# Patient Record
Sex: FEMALE | Race: WHITE | Employment: OTHER | URBAN - METROPOLITAN AREA
[De-identification: names, ages, dates, MRNs, and addresses within clinical notes are randomized per-mention and may not be internally consistent; named-entity substitution may affect disease eponyms.]

---

## 2017-05-18 ENCOUNTER — NURSE ONLY (OUTPATIENT)
Age: 67
End: 2017-05-18

## 2017-05-18 DIAGNOSIS — M81.0 OSTEOPOROSIS, POSTMENOPAUSAL: Primary | ICD-10-CM

## 2017-05-18 PROCEDURE — 96372 THER/PROPH/DIAG INJ SC/IM: CPT | Performed by: INTERNAL MEDICINE

## 2017-10-04 ENCOUNTER — HOSPITAL ENCOUNTER (OUTPATIENT)
Dept: MAMMOGRAPHY | Age: 67
Discharge: HOME | End: 2017-10-04
Admitting: OBSTETRICS & GYNECOLOGY

## 2017-10-04 DIAGNOSIS — Z12.31 VISIT FOR SCREENING MAMMOGRAM: ICD-10-CM

## 2018-01-24 ENCOUNTER — NURSE ONLY (OUTPATIENT)
Age: 68
End: 2018-01-24

## 2018-01-24 DIAGNOSIS — M81.0 OSTEOPOROSIS, POSTMENOPAUSAL: Primary | ICD-10-CM

## 2018-01-24 PROCEDURE — 96372 THER/PROPH/DIAG INJ SC/IM: CPT | Performed by: INTERNAL MEDICINE

## 2018-07-30 ENCOUNTER — HOSPITAL ENCOUNTER (OUTPATIENT)
Dept: GENERAL RADIOLOGY | Age: 68
Discharge: HOME | End: 2018-07-30
Payer: MEDICARE

## 2018-07-30 ENCOUNTER — PROCEDURE VISIT (OUTPATIENT)
Age: 68
End: 2018-07-30

## 2018-07-30 ENCOUNTER — OFFICE VISIT (OUTPATIENT)
Age: 68
End: 2018-07-30

## 2018-07-30 VITALS
HEIGHT: 66 IN | SYSTOLIC BLOOD PRESSURE: 118 MMHG | DIASTOLIC BLOOD PRESSURE: 62 MMHG | WEIGHT: 85.2 LBS | BODY MASS INDEX: 13.69 KG/M2

## 2018-07-30 DIAGNOSIS — M81.0 OSTEOPOROSIS, POSTMENOPAUSAL: ICD-10-CM

## 2018-07-30 DIAGNOSIS — M81.0 OSTEOPOROSIS, POSTMENOPAUSAL: Primary | ICD-10-CM

## 2018-07-30 DIAGNOSIS — E55.9 VITAMIN D DEFICIENCY: ICD-10-CM

## 2018-07-30 DIAGNOSIS — Z51.81 MEDICATION MONITORING ENCOUNTER: ICD-10-CM

## 2018-07-30 PROCEDURE — 1101F PT FALLS ASSESS-DOCD LE1/YR: CPT | Performed by: INTERNAL MEDICINE

## 2018-07-30 PROCEDURE — 77080 DXA BONE DENSITY AXIAL: CPT | Performed by: INTERNAL MEDICINE

## 2018-07-30 PROCEDURE — 1090F PRES/ABSN URINE INCON ASSESS: CPT | Performed by: INTERNAL MEDICINE

## 2018-07-30 PROCEDURE — 4040F PNEUMOC VAC/ADMIN/RCVD: CPT | Performed by: INTERNAL MEDICINE

## 2018-07-30 PROCEDURE — G8427 DOCREV CUR MEDS BY ELIG CLIN: HCPCS | Performed by: INTERNAL MEDICINE

## 2018-07-30 PROCEDURE — 3017F COLORECTAL CA SCREEN DOC REV: CPT | Performed by: INTERNAL MEDICINE

## 2018-07-30 PROCEDURE — 1123F ACP DISCUSS/DSCN MKR DOCD: CPT | Performed by: INTERNAL MEDICINE

## 2018-07-30 PROCEDURE — 1036F TOBACCO NON-USER: CPT | Performed by: INTERNAL MEDICINE

## 2018-07-30 PROCEDURE — G8419 CALC BMI OUT NRM PARAM NOF/U: HCPCS | Performed by: INTERNAL MEDICINE

## 2018-07-30 PROCEDURE — 77080 DXA BONE DENSITY AXIAL: CPT

## 2018-07-30 PROCEDURE — G8399 PT W/DXA RESULTS DOCUMENT: HCPCS | Performed by: INTERNAL MEDICINE

## 2018-07-30 PROCEDURE — 96372 THER/PROPH/DIAG INJ SC/IM: CPT | Performed by: INTERNAL MEDICINE

## 2018-07-30 PROCEDURE — 99214 OFFICE O/P EST MOD 30 MIN: CPT | Performed by: INTERNAL MEDICINE

## 2018-07-30 RX ORDER — ESOMEPRAZOLE MAGNESIUM 40 MG/1
40 FOR SUSPENSION ORAL DAILY
COMMUNITY
End: 2019-10-01 | Stop reason: ALTCHOICE

## 2018-07-30 RX ORDER — LORATADINE 10 MG/1
10 TABLET ORAL PRN
COMMUNITY

## 2018-07-30 NOTE — PROGRESS NOTES
[facility]) Osteoporosis and [address] [place] [address]   Phone [phone]  Fax [phone]    NAME: Jackie Warner   : 1950   STUDY DATE: 2018     REFERRING PROVIDER: Marj Chery MD    INDICATION(S) FOR PERFORMING THE STUDY:  osteoporosis, age-related (M81.0)    CLINICAL INFORMATION PROVIDED BY THE PATIENT: 73-year-old woman. She went through natural menopause in her late 45s. No history of fragility fractures. No long-term corticosteroid use. She took weekly Actonel [id number], monthly Boniva 2007-2013 and Actonel monthly 2013-2015. Current treatment is Prolia started 10/2015. EQUIPMENT: Hologic Discovery. POSITIONING: Good. REGIONS OF INTEREST: Correct. ARTIFACTS: None. STUDY VALID? Yes. Spine BMD is spuriously high because of generalized degenerative change;  no adjustments were made. T-scores  Initial study: 2009 L1-L4 -3.6 left total hip -3.1   Current study: 2018 L1-L4 -3.5 left total hip -2.9     The table below shows bone mineral density (grams/cm2), the appropriate measure for comparing serial scans. An increase or decrease is significant based on precision studies done at our center according to the ISCD protocol. PA spine Proximal Femur (left)   Date L1-L4 Fem. neck Trochanter Total hip   2009 0.655 0.639 NA 0.573   2012* 0.641 0.559 0.371 0.582   2013 0.619 0.606 0.389 0.579   2014 0.636 0.607 0.412 0.589   2015 0.622 0.584 0.421 0.574   10/18/2016 0.639 0.586 0.438 0.603   2018 0.667 0.606 0.423 0.590   *Done with [employer] equipment. BMD converted to Hologic units. IMPRESSION:  BONE DENSITY IS LOW, CONSISTENT WITH OSTEOPOROSIS.   BETWEEN 2017 AND 2018, BMD TRENDING UP IN THE SPINE BUT DID NOT CHANGE SIGNIFICANTLY IN THE LEFT HIP. COMPARED WITH 2015, BEFORE STARTING PROLIA, BMD IS HIGHER NOW IN THE SPINE AND TRENDING UP IN THE FEMORAL NECK AND TOTAL

## 2018-07-30 NOTE — PROGRESS NOTES
thought to have little or no systemic effect in lower doses but high doses could have adverse consequences. She is doing OK with Prolia started 10/2015. PLANS. Continue treatment with Prolia 60 mg SQ twice yearly. I gave her the contact information for Dr. Divya Yeung in [place] to try to work out her winter dose of Prolia. Return with DXA in 1 year. I spent 25 minutes face to face with this patient. Over 50% of that time was spent on counseling and care coordination. See assessment and plan for counseling and care coordination details. Shannan Hurd MD, Director, [employer] [facility]    CC: Yaquelin Corea MD

## 2018-10-08 ENCOUNTER — HOSPITAL ENCOUNTER (OUTPATIENT)
Dept: MAMMOGRAPHY | Age: 68
Discharge: HOME | End: 2018-10-08
Payer: MEDICARE

## 2018-10-08 DIAGNOSIS — Z12.31 VISIT FOR SCREENING MAMMOGRAM: ICD-10-CM

## 2018-10-08 PROCEDURE — 77063 BREAST TOMOSYNTHESIS BI: CPT

## 2019-02-22 DIAGNOSIS — M81.0 OSTEOPOROSIS, POSTMENOPAUSAL: Primary | ICD-10-CM

## 2019-02-28 ENCOUNTER — NURSE ONLY (OUTPATIENT)
Dept: ENDOCRINOLOGY | Age: 69
End: 2019-02-28
Payer: MEDICARE

## 2019-02-28 DIAGNOSIS — M81.0 OSTEOPOROSIS, POSTMENOPAUSAL: Primary | ICD-10-CM

## 2019-02-28 PROCEDURE — 96372 THER/PROPH/DIAG INJ SC/IM: CPT | Performed by: INTERNAL MEDICINE

## 2019-10-01 ENCOUNTER — PROCEDURE VISIT (OUTPATIENT)
Dept: ENDOCRINOLOGY | Age: 69
End: 2019-10-01

## 2019-10-01 ENCOUNTER — HOSPITAL ENCOUNTER (OUTPATIENT)
Dept: GENERAL RADIOLOGY | Age: 69
Discharge: HOME | End: 2019-10-01
Payer: MEDICARE

## 2019-10-01 ENCOUNTER — OFFICE VISIT (OUTPATIENT)
Dept: ENDOCRINOLOGY | Age: 69
End: 2019-10-01
Payer: MEDICARE

## 2019-10-01 VITALS
SYSTOLIC BLOOD PRESSURE: 110 MMHG | DIASTOLIC BLOOD PRESSURE: 74 MMHG | BODY MASS INDEX: 13.82 KG/M2 | WEIGHT: 86 LBS | HEIGHT: 66 IN

## 2019-10-01 DIAGNOSIS — M81.0 OSTEOPOROSIS, POSTMENOPAUSAL: ICD-10-CM

## 2019-10-01 DIAGNOSIS — M81.0 OSTEOPOROSIS, POSTMENOPAUSAL: Primary | ICD-10-CM

## 2019-10-01 DIAGNOSIS — E55.9 VITAMIN D DEFICIENCY: ICD-10-CM

## 2019-10-01 DIAGNOSIS — Z51.81 MEDICATION MONITORING ENCOUNTER: ICD-10-CM

## 2019-10-01 PROCEDURE — G8419 CALC BMI OUT NRM PARAM NOF/U: HCPCS | Performed by: INTERNAL MEDICINE

## 2019-10-01 PROCEDURE — 4040F PNEUMOC VAC/ADMIN/RCVD: CPT | Performed by: INTERNAL MEDICINE

## 2019-10-01 PROCEDURE — 99214 OFFICE O/P EST MOD 30 MIN: CPT | Performed by: INTERNAL MEDICINE

## 2019-10-01 PROCEDURE — G8484 FLU IMMUNIZE NO ADMIN: HCPCS | Performed by: INTERNAL MEDICINE

## 2019-10-01 PROCEDURE — 1090F PRES/ABSN URINE INCON ASSESS: CPT | Performed by: INTERNAL MEDICINE

## 2019-10-01 PROCEDURE — 1123F ACP DISCUSS/DSCN MKR DOCD: CPT | Performed by: INTERNAL MEDICINE

## 2019-10-01 PROCEDURE — 77080 DXA BONE DENSITY AXIAL: CPT

## 2019-10-01 PROCEDURE — 77080 DXA BONE DENSITY AXIAL: CPT | Performed by: INTERNAL MEDICINE

## 2019-10-01 PROCEDURE — G8399 PT W/DXA RESULTS DOCUMENT: HCPCS | Performed by: INTERNAL MEDICINE

## 2019-10-01 PROCEDURE — G8427 DOCREV CUR MEDS BY ELIG CLIN: HCPCS | Performed by: INTERNAL MEDICINE

## 2019-10-01 PROCEDURE — 96372 THER/PROPH/DIAG INJ SC/IM: CPT | Performed by: INTERNAL MEDICINE

## 2019-10-01 PROCEDURE — 3017F COLORECTAL CA SCREEN DOC REV: CPT | Performed by: INTERNAL MEDICINE

## 2019-10-01 PROCEDURE — 1036F TOBACCO NON-USER: CPT | Performed by: INTERNAL MEDICINE

## 2019-10-01 RX ORDER — CLOPIDOGREL BISULFATE 75 MG/1
TABLET ORAL
COMMUNITY
Start: 2019-08-03

## 2019-10-01 RX ORDER — RANITIDINE 150 MG/1
150 TABLET ORAL
COMMUNITY
Start: 2018-06-07

## 2019-10-01 RX ORDER — ATORVASTATIN CALCIUM 80 MG/1
TABLET, FILM COATED ORAL
COMMUNITY
Start: 2019-09-15

## 2019-10-01 RX ORDER — GLYCOPYRROLATE AND FORMOTEROL FUMARATE 9; 4.8 UG/1; UG/1
AEROSOL, METERED RESPIRATORY (INHALATION)
COMMUNITY
Start: 2019-08-05 | End: 2020-10-12

## 2019-10-01 RX ORDER — PANTOPRAZOLE SODIUM 40 MG/1
20 TABLET, DELAYED RELEASE ORAL
COMMUNITY
Start: 2019-09-28

## 2019-10-01 RX ORDER — TRIAMCINOLONE ACETONIDE 1 MG/G
CREAM TOPICAL
COMMUNITY
Start: 2019-08-01

## 2019-10-21 ENCOUNTER — HOSPITAL ENCOUNTER (OUTPATIENT)
Dept: MAMMOGRAPHY | Age: 69
Discharge: HOME | End: 2019-10-21
Payer: MEDICARE

## 2019-10-21 DIAGNOSIS — Z12.31 VISIT FOR SCREENING MAMMOGRAM: ICD-10-CM

## 2019-10-21 PROCEDURE — 77063 BREAST TOMOSYNTHESIS BI: CPT

## 2020-05-01 ENCOUNTER — NURSE ONLY (OUTPATIENT)
Dept: ENDOCRINOLOGY | Age: 70
End: 2020-05-01
Payer: MEDICARE

## 2020-05-01 PROCEDURE — 96372 THER/PROPH/DIAG INJ SC/IM: CPT | Performed by: INTERNAL MEDICINE

## 2020-10-12 ENCOUNTER — HOSPITAL ENCOUNTER (OUTPATIENT)
Dept: GENERAL RADIOLOGY | Age: 70
Discharge: HOME | End: 2020-10-12
Payer: MEDICARE

## 2020-10-12 ENCOUNTER — OFFICE VISIT (OUTPATIENT)
Dept: ENDOCRINOLOGY | Age: 70
End: 2020-10-12
Payer: MEDICARE

## 2020-10-12 ENCOUNTER — PROCEDURE VISIT (OUTPATIENT)
Dept: ENDOCRINOLOGY | Age: 70
End: 2020-10-12

## 2020-10-12 VITALS
SYSTOLIC BLOOD PRESSURE: 100 MMHG | DIASTOLIC BLOOD PRESSURE: 50 MMHG | HEIGHT: 65 IN | TEMPERATURE: 97.5 F | HEART RATE: 62 BPM | BODY MASS INDEX: 14.43 KG/M2 | OXYGEN SATURATION: 92 % | WEIGHT: 86.6 LBS

## 2020-10-12 PROCEDURE — 1123F ACP DISCUSS/DSCN MKR DOCD: CPT | Performed by: INTERNAL MEDICINE

## 2020-10-12 PROCEDURE — 99214 OFFICE O/P EST MOD 30 MIN: CPT | Performed by: INTERNAL MEDICINE

## 2020-10-12 PROCEDURE — 3017F COLORECTAL CA SCREEN DOC REV: CPT | Performed by: INTERNAL MEDICINE

## 2020-10-12 PROCEDURE — G8399 PT W/DXA RESULTS DOCUMENT: HCPCS | Performed by: INTERNAL MEDICINE

## 2020-10-12 PROCEDURE — 77080 DXA BONE DENSITY AXIAL: CPT | Performed by: INTERNAL MEDICINE

## 2020-10-12 PROCEDURE — 4040F PNEUMOC VAC/ADMIN/RCVD: CPT | Performed by: INTERNAL MEDICINE

## 2020-10-12 PROCEDURE — 1036F TOBACCO NON-USER: CPT | Performed by: INTERNAL MEDICINE

## 2020-10-12 PROCEDURE — G8484 FLU IMMUNIZE NO ADMIN: HCPCS | Performed by: INTERNAL MEDICINE

## 2020-10-12 PROCEDURE — G8427 DOCREV CUR MEDS BY ELIG CLIN: HCPCS | Performed by: INTERNAL MEDICINE

## 2020-10-12 PROCEDURE — G8419 CALC BMI OUT NRM PARAM NOF/U: HCPCS | Performed by: INTERNAL MEDICINE

## 2020-10-12 PROCEDURE — 1090F PRES/ABSN URINE INCON ASSESS: CPT | Performed by: INTERNAL MEDICINE

## 2020-10-12 PROCEDURE — 77080 DXA BONE DENSITY AXIAL: CPT

## 2020-10-12 NOTE — PROGRESS NOTES
[facility] ([facility]) Osteoporosis and [address] [facility]  [place] [address] [facility] [address]  Phone [phone]  Fax [phone]    NAME:  Norberto Moore  : 1950  CONSULT DATE: 2013  MOST RECENT VISIT: 10/01/2019  TODAYS DATE: 10/12/2020    Labs @ [facility] 2020    PROBLEMS. Osteoporosis diagnosed , DXA results not available    DXA 2009, lowest T-score -3.6 in the spine     BMD may have decreased in the femoral neck [id number]  Natural menopause age mid to late-40s, ~  Vitamin D deficiency, desirable 25-OH is 30-60 ng/mL    10 ng/mL 2009    53 ng/mL2010 on vitamin D 1600 IU/d    53 ng/mL 2015    46 ng/mL 2017  Microscopic colitis diagnosed , flare in , not yet controlled  NEW  anemia => high cardiac CT score => coronary artery disease, 3 stents    CURRENT MANAGEMENT FOR OSTEOPOROSIS. Calcium, diet 800 mg/d  mg/d = 1300 mg/d    300 mg/d cheese, 200 mg/d leafy greens, 300 mg/d Other  Vitamin D, 1000 IU + 800 IU MVI= 1800 IU/d  Exercise, walking  Pharmacologic therapy, Prolia 60 mg SQ twice yearly started 10/2015    PREVIOUS MEDICATIONS FOR OSTEOPOROSIS. Estrogen, ~-~  Actonel 35 mg/week, [id number]  Boniva 2007-2013  Actonel 150 mg monthly 2013-10/2015    OTHER CURRENT MEDICATIONS. Euceris  OTC MEDICATIONS. ASA 81 mg daily    CHIEF COMPLAINT. Here for followup of osteoporosis and vitamin D deficiency, monitoring treatment. No new related signs or symptoms. INTERVAL HISTORY. See problem list for chronic/inactive conditions. She received Prolia without side effects. No falls, near-falls or fractures. Colitis has not been much of a problem this year. They so their place in [place] so are back in [place] full time. FOR FULL DETAILS OF FAMILY HISTORY, PAST MEDICAL AND SURGICAL HISTORY, SOCIAL HISTORY, AND REVIEW OF SYSTEMS, SEE PATIENT QUESTIONNAIRE OF TODAYS DATE. PHYSICAL EXAMINATION. GENERAL.   [place] thin, normally proportioned adult. BMI 15.3  MENTAL STATUS. Pleasant mood. Oriented to time, place, and person. MUSCULOSKELETAL. Spinal contours are normal.  No spine tenderness to palpation or percussion. Three finger spaces between ribs and pelvis. Gait steady without assistance. NEUROLOGICAL. Able to rise from chair without using arms. No apparent focal motor or sensory deficit. Coordination appears normal.     BONE DENSITY. Most recent done here using Hologic equipment. T-scores  Initial study: 11/17/2009 L1-L4 -3.6 left total hip -3.1   Current study: 10/12/2020 L1-L4 -3.6 left total hip -2.9     The table below shows bone mineral density (grams/cm2), the appropriate measure for comparing serial scans. An increase or decrease is significant based on precision studies done at our center according to the ISCD protocol. PA spine Proximal Femur (left)   Date L1-L4 Fem. neck Trochanter Total hip   11/17/2009 0.655 0.639 NA 0.573   09/17/2012* 0.641 0.559 0.371 0.582   09/23/2013 0.619 0.606 0.389 0.579   09/23/2014 0.636 0.607 0.412 0.589   09/28/2015 0.622 0.584 0.421 0.574   10/18/2016 0.639 0.586 0.438 0.603   07/30/2018 0.667 0.606 0.423 0.590   10/01/2019 0.639 0.591 0.436 0.593   10/12/2020 0.652 0.597 0.433 0.589   *Done with [employer] equipment. BMD converted to Hologic units. IMPRESSION:  BONE DENSITY IS LOW, CONSISTENT WITH OSTEOPOROSIS. SINCE THE PREVIOUS DXA, BMD DID NOT CHANGE SIGNIFICANTLY IN THE SPINE OR LEFT HIP. COMPARED WITH 2015, BEFORE STARTING PROLIA, BMD IS HIGHER NOW IN THE SPINE. Labs: 09/2013, Ca 9.5, Cr 0.7, . 08/2015, Ca 9.3, Cr 0.7. 05/2016 Ca 9.4 Cr 0.7.  03/2018 Ca 8.8 Cr 0.7. 01/2019 Ca 8.5 Cr 0.5. 06/2020 Ca 9.6 Cr 0.7. IMAGING. DXA printouts reviewed. 01/2018 CXR. ASSESSMENT. Osteoporosis, bone density lower than desirable. BMD may have decreased in the femoral neck 5234-9798 in spite of treatment with Boniva.  BMD has been stable or increased since 2012 but still quite low. Risk of fracture is high due in part to continued activity of colitis. Entocort and Uceris are thought to have little or no systemic effect in lower doses but high doses could have adverse consequences. She is doing OK with Prolia started 10/2015. PLANS. Continue treatment with Prolia 60 mg SQ twice yearly. Return with DXA in 1 year. I spent 25 minutes face to face with this patient. Over 50% of that time was spent on counseling and care coordination. See assessment and plan for counseling and care coordination details. Maddy Hurd MD, Director, [facility]) Osteoporosis and Bone Health Services    CC: Jomar Tovar MD

## 2020-10-12 NOTE — RESULT ENCOUNTER NOTE
[facility]) Osteoporosis and [address] [place] [address]., [address] [place] [address]   Phone [phone]  Fax [phone]    NAME: Quincy Mallory   : 1950   STUDY DATE: 10/12/2020     REFERRING PROVIDER: Thien Carpio MD    INDICATION(S) FOR PERFORMING THE STUDY:  osteoporosis, age-related (M81.0)    CLINICAL INFORMATION PROVIDED BY THE PATIENT: 22-year-old woman. She went through natural menopause in her late 45s. No history of fragility fractures. No long-term corticosteroid use. She took weekly Actonel [id number], monthly Boniva 2007-2013 and Actonel monthly 2013-2015. Current treatment is Prolia started 10/2015. EQUIPMENT: Hologic Discovery. POSITIONING: Good. REGIONS OF INTEREST: Correct. ARTIFACTS: None. STUDY VALID? Yes. Spine BMD is spuriously high because of generalized degenerative change;  no adjustments were made. T-scores  Initial study: 2009 L1-L4 -3.6 left total hip -3.1   Current study: 10/12/2020 L1-L4 -3.6 left total hip -2.9     The table below shows bone mineral density (grams/cm2), the appropriate measure for comparing serial scans. An increase or decrease is significant based on precision studies done at our center according to the ISCD protocol. PA spine Proximal Femur (left)   Date L1-L4 Fem. neck Trochanter Total hip   2009 0.655 0.639 NA 0.573   2012* 0.641 0.559 0.371 0.582   2013 0.619 0.606 0.389 0.579   2014 0.636 0.607 0.412 0.589   2015 0.622 0.584 0.421 0.574   10/18/2016 0.639 0.586 0.438 0.603   2018 0.667 0.606 0.423 0.590   10/01/2019 0.639 0.591 0.436 0.593   10/12/2020 0.652 0.597 0.433 0.589   *Done with [facility] 428 equipment. BMD converted to Hologic units. IMPRESSION:  BONE DENSITY IS LOW, CONSISTENT WITH OSTEOPOROSIS.   SINCE THE PREVIOUS DXA, BMD DID NOT CHANGE SIGNIFICANTLY IN THE SPINE OR LEFT HIP. COMPARED WITH , BEFORE STARTING PROLIA, BMD IS HIGHER NOW IN

## 2020-11-04 ENCOUNTER — NURSE ONLY (OUTPATIENT)
Dept: ENDOCRINOLOGY | Age: 70
End: 2020-11-04
Payer: MEDICARE

## 2020-11-04 PROCEDURE — 96372 THER/PROPH/DIAG INJ SC/IM: CPT | Performed by: INTERNAL MEDICINE

## 2020-11-24 ENCOUNTER — HOSPITAL ENCOUNTER (OUTPATIENT)
Dept: MAMMOGRAPHY | Age: 70
Discharge: HOME | End: 2020-11-24
Payer: MEDICARE

## 2020-11-24 PROCEDURE — 77063 BREAST TOMOSYNTHESIS BI: CPT

## 2021-05-20 ENCOUNTER — NURSE ONLY (OUTPATIENT)
Dept: ENDOCRINOLOGY | Age: 71
End: 2021-05-20
Payer: MEDICARE

## 2021-05-20 DIAGNOSIS — M81.0 AGE-RELATED OSTEOPOROSIS WITHOUT CURRENT PATHOLOGICAL FRACTURE: Primary | ICD-10-CM

## 2021-05-20 PROCEDURE — 96372 THER/PROPH/DIAG INJ SC/IM: CPT | Performed by: INTERNAL MEDICINE

## 2021-11-29 ENCOUNTER — HOSPITAL ENCOUNTER (OUTPATIENT)
Dept: MAMMOGRAPHY | Age: 71
Discharge: HOME | End: 2021-11-29
Payer: MEDICARE

## 2021-11-29 VITALS — WEIGHT: 80 LBS | BODY MASS INDEX: 13.33 KG/M2 | HEIGHT: 65 IN

## 2021-11-29 DIAGNOSIS — Z12.31 VISIT FOR SCREENING MAMMOGRAM: ICD-10-CM

## 2021-11-29 PROCEDURE — 77063 BREAST TOMOSYNTHESIS BI: CPT

## 2021-12-14 ENCOUNTER — HOSPITAL ENCOUNTER (OUTPATIENT)
Dept: GENERAL RADIOLOGY | Age: 71
Discharge: HOME | End: 2021-12-14
Payer: MEDICARE

## 2021-12-14 ENCOUNTER — OFFICE VISIT (OUTPATIENT)
Dept: ENDOCRINOLOGY | Age: 71
End: 2021-12-14
Payer: MEDICARE

## 2021-12-14 ENCOUNTER — PROCEDURE VISIT (OUTPATIENT)
Dept: ENDOCRINOLOGY | Age: 71
End: 2021-12-14

## 2021-12-14 VITALS
HEIGHT: 65 IN | SYSTOLIC BLOOD PRESSURE: 106 MMHG | WEIGHT: 81.4 LBS | BODY MASS INDEX: 13.56 KG/M2 | DIASTOLIC BLOOD PRESSURE: 60 MMHG

## 2021-12-14 DIAGNOSIS — M81.0 OSTEOPOROSIS, POSTMENOPAUSAL: ICD-10-CM

## 2021-12-14 DIAGNOSIS — E55.9 VITAMIN D DEFICIENCY: ICD-10-CM

## 2021-12-14 DIAGNOSIS — M81.0 OSTEOPOROSIS, POSTMENOPAUSAL: Primary | ICD-10-CM

## 2021-12-14 DIAGNOSIS — Z51.81 MEDICATION MONITORING ENCOUNTER: ICD-10-CM

## 2021-12-14 PROCEDURE — G8484 FLU IMMUNIZE NO ADMIN: HCPCS | Performed by: INTERNAL MEDICINE

## 2021-12-14 PROCEDURE — G8427 DOCREV CUR MEDS BY ELIG CLIN: HCPCS | Performed by: INTERNAL MEDICINE

## 2021-12-14 PROCEDURE — G8419 CALC BMI OUT NRM PARAM NOF/U: HCPCS | Performed by: INTERNAL MEDICINE

## 2021-12-14 PROCEDURE — 1123F ACP DISCUSS/DSCN MKR DOCD: CPT | Performed by: INTERNAL MEDICINE

## 2021-12-14 PROCEDURE — 77080 DXA BONE DENSITY AXIAL: CPT

## 2021-12-14 PROCEDURE — 1036F TOBACCO NON-USER: CPT | Performed by: INTERNAL MEDICINE

## 2021-12-14 PROCEDURE — 4040F PNEUMOC VAC/ADMIN/RCVD: CPT | Performed by: INTERNAL MEDICINE

## 2021-12-14 PROCEDURE — G8399 PT W/DXA RESULTS DOCUMENT: HCPCS | Performed by: INTERNAL MEDICINE

## 2021-12-14 PROCEDURE — 1090F PRES/ABSN URINE INCON ASSESS: CPT | Performed by: INTERNAL MEDICINE

## 2021-12-14 PROCEDURE — 96372 THER/PROPH/DIAG INJ SC/IM: CPT | Performed by: INTERNAL MEDICINE

## 2021-12-14 PROCEDURE — 99214 OFFICE O/P EST MOD 30 MIN: CPT | Performed by: INTERNAL MEDICINE

## 2021-12-14 PROCEDURE — 77080 DXA BONE DENSITY AXIAL: CPT | Performed by: INTERNAL MEDICINE

## 2021-12-14 PROCEDURE — 3017F COLORECTAL CA SCREEN DOC REV: CPT | Performed by: INTERNAL MEDICINE

## 2021-12-14 RX ORDER — DOXYCYCLINE HYCLATE 50 MG/1
324 CAPSULE, GELATIN COATED ORAL
COMMUNITY

## 2021-12-14 RX ORDER — LEVOTHYROXINE SODIUM 0.05 MG/1
50 TABLET ORAL DAILY
COMMUNITY
Start: 2021-11-09

## 2021-12-14 NOTE — RESULT ENCOUNTER NOTE
[facility]) Osteoporosis and [address] [facility] [address] [facility], [address] [facility],[place] [address]  Phone [phone]  Fax [phone]    NAME: Cheryle Dials   : 1950   STUDY DATE: 2021     REFERRING PROVIDER: William Baptiste MD    INDICATION(S) FOR PERFORMING THE STUDY:  osteoporosis, age-related (M81.0)    CLINICAL INFORMATION PROVIDED BY THE PATIENT: 73-year-old woman. She went through natural menopause in her late 45s. No history of fragility fractures. No long-term corticosteroid use. She took weekly Actonel [id number], monthly Boniva 2007-2013 and Actonel monthly 2013-2015. Current treatment is Prolia started 10/2015. EQUIPMENT: Hologic Discovery. POSITIONING: Good. REGIONS OF INTEREST: Correct. ARTIFACTS: None. STUDY VALID? Yes. Spine BMD is spuriously high because of generalized degenerative change;  no adjustments were made. T-scores  Initial study: 2009 L1-L4 -3.6 left total hip -3.1   Current study: 2021 L1-L4 -3.6 left total hip -2.7     The table below shows bone mineral density (grams/cm2), the appropriate measure for comparing serial scans. An increase or decrease is significant based on precision studies done at our center according to the ISCD protocol. PA spine Proximal Femur (left)   Date L1-L4 Fem. neck Trochanter Total hip   2009 0.655 0.639 NA 0.573   2013 0.619 0.606 0.389 0.579   2014 0.636 0.607 0.412 0.589   2015 0.622 0.584 0.421 0.574   2018 0.667 0.606 0.423 0.590   10/12/2020 0.652 0.597 0.433 0.589   2021 0.665 0.589 0.448 0.618   *Done with [employer] equipment. BMD converted to Hologic units. IMPRESSION:  BONE DENSITY IS LOW, CONSISTENT WITH OSTEOPOROSIS. SINCE THE PREVIOUS DXA, BMD DID NOT CHANGE SIGNIFICANTLY IN THE SPINE OR LEFT HIP. COMPARED WITH 2015, BEFORE STARTING PROLIA, BMD IS HIGHER NOW IN THE SPINE.      Consider repeating this study in 1-2 years to assess the patient's progress. _________________________________________________   Reuben Hurd MD, Director, [facility] ([facility]) Osteoporosis and [address] [facility]

## 2021-12-14 NOTE — PROGRESS NOTES
[facility] ([facility]) Osteoporosis and [address] [facility]  [place] [address] [facility], [address]  Phone [phone]  Fax [phone]    NAME:  Thania Subramanian  : 1950  CONSULT DATE: 2013  MOST RECENT VISIT: 10/12/2020  TODAYS DATE: 2021    Labs @ [facility] 2021    PROBLEMS. Osteoporosis diagnosed , DXA results not available    DXA 2009, lowest T-score -3.6 in the spine     BMD may have decreased in the femoral neck [id number]  Natural menopause age mid to late-40s, ~  Vitamin D deficiency, desirable 25-OH is 30-60 ng/mL    10 ng/mL 2009    53 ng/mL2010 on vitamin D 1600 IU/d    53 ng/mL 2015    46 ng/mL 2017    51 ng/mL 10/2020  Microscopic colitis diagnosed , flare in , not yet controlled  NEW  anemia => high cardiac CT score => coronary artery disease, 3 stents    CURRENT MANAGEMENT FOR OSTEOPOROSIS. Calcium, diet 800 mg/d  mg/d = 1300 mg/d    300 mg/d cheese, 200 mg/d leafy greens, 300 mg/d Other  Vitamin D, 1000 IU + 800 IU MVI= 1800 IU/d  Exercise, walking  Pharmacologic therapy, Prolia 60 mg SQ twice yearly started 10/2015    PREVIOUS MEDICATIONS FOR OSTEOPOROSIS. Estrogen, ~-~  Actonel 35 mg/week, [id number]  Boniva 2007-2013  Actonel 150 mg monthly 2013-10/2015    OTHER CURRENT MEDICATIONS. Euceris, budesonide  OTC MEDICATIONS. ASA 81 mg daily    CHIEF COMPLAINT. Here for followup of osteoporosis and vitamin D deficiency, monitoring treatment. No new related signs or symptoms. INTERVAL HISTORY. See problem list for chronic/inactive conditions. She received Prolia without side effects. No falls, near-falls or fractures. Colitis has not been much of a problem this year. They so their place in [place] so are back in [place] full time. FOR FULL DETAILS OF FAMILY HISTORY, PAST MEDICAL AND SURGICAL HISTORY, SOCIAL HISTORY, AND REVIEW OF SYSTEMS, SEE PATIENT QUESTIONNAIRE OF TODAYS DATE.     PHYSICAL EXAMINATION. GENERAL. Petite, thin, normally proportioned adult. BMI 15.3  MENTAL STATUS. Pleasant mood. Oriented to time, place, and person. MUSCULOSKELETAL. Spinal contours are normal.  No spine tenderness to palpation or percussion. Three finger spaces between ribs and pelvis. Gait steady without assistance. NEUROLOGICAL. Able to rise from chair without using arms. No apparent focal motor or sensory deficit. Coordination appears normal.     BONE DENSITY. Most recent done here using Hologic equipment. T-scores  Initial study: 11/17/2009 L1-L4 -3.6 left total hip -3.1   Current study: 12/14/2021 L1-L4 -3.6 left total hip -2.7     The table below shows bone mineral density (grams/cm2), the appropriate measure for comparing serial scans. An increase or decrease is significant based on precision studies done at our center according to the ISCD protocol. PA spine Proximal Femur (left)   Date L1-L4 Fem. neck Trochanter Total hip   11/17/2009 0.655 0.639 NA 0.573   09/23/2013 0.619 0.606 0.389 0.579   09/23/2014 0.636 0.607 0.412 0.589   09/28/2015 0.622 0.584 0.421 0.574   07/30/2018 0.667 0.606 0.423 0.590   10/12/2020 0.652 0.597 0.433 0.589   12/14/2021 0.665 0.589 0.448 0.618   *Done with [facility] equipment. BMD converted to Hologic units. IMPRESSION:  BONE DENSITY IS LOW, CONSISTENT WITH OSTEOPOROSIS. SINCE THE PREVIOUS DXA, BMD DID NOT CHANGE SIGNIFICANTLY IN THE SPINE OR LEFT HIP. COMPARED WITH 2015, BEFORE STARTING PROLIA, BMD IS HIGHER NOW IN THE SPINE. Labs: 09/2013, Ca 9.5, Cr 0.7, . 08/2015, Ca 9.3, Cr 0.7. 05/2016 Ca 9.4 Cr 0.7.  03/2018 Ca 8.8 Cr 0.7. 01/2019 Ca 8.5 Cr 0.5. 06/2020 Ca 9.6 Cr 0.7. 11/2021 Ca 9.4 Cr 0.8. IMAGING. DXA printouts reviewed. 01/2018 CXR. ASSESSMENT. Osteoporosis, bone density lower than desirable. BMD may have decreased in the femoral neck 3890-6430 in spite of treatment with Boniva.  BMD has been stable or increased since 2012 but still quite low. Risk of fracture is high due in part to continued activity of colitis. Entocort and Uceris are thought to have little or no systemic effect in lower doses but high doses could have adverse consequences. She is doing OK with Prolia started 10/2015. PLANS. Continue treatment with Prolia 60 mg SQ twice yearly. Return with DXA in 1 year. Time spent today: 30-40 minutes. Oral Hurd MD, Director, [facility]) Osteoporosis and Bone Health Services    CC: Venkatesh Jacobsen [facility] MD

## 2022-05-25 ENCOUNTER — NURSE ONLY (OUTPATIENT)
Dept: ENDOCRINOLOGY | Age: 72
End: 2022-05-25
Payer: MEDICARE

## 2022-05-25 DIAGNOSIS — M81.0 OSTEOPOROSIS, POSTMENOPAUSAL: ICD-10-CM

## 2022-05-25 PROCEDURE — 96372 THER/PROPH/DIAG INJ SC/IM: CPT | Performed by: INTERNAL MEDICINE

## 2022-12-07 ENCOUNTER — NURSE ONLY (OUTPATIENT)
Dept: ENDOCRINOLOGY | Age: 72
End: 2022-12-07
Payer: MEDICARE

## 2022-12-07 DIAGNOSIS — M81.0 OSTEOPOROSIS, POSTMENOPAUSAL: Primary | ICD-10-CM

## 2022-12-07 PROCEDURE — 96372 THER/PROPH/DIAG INJ SC/IM: CPT | Performed by: INTERNAL MEDICINE

## 2022-12-12 ENCOUNTER — HOSPITAL ENCOUNTER (OUTPATIENT)
Dept: MAMMOGRAPHY | Age: 72
Discharge: HOME | End: 2022-12-12
Payer: MEDICARE

## 2022-12-12 VITALS — HEIGHT: 65 IN | BODY MASS INDEX: 13.49 KG/M2 | WEIGHT: 81 LBS

## 2022-12-12 DIAGNOSIS — Z12.31 VISIT FOR SCREENING MAMMOGRAM: ICD-10-CM

## 2022-12-12 PROCEDURE — 77067 SCR MAMMO BI INCL CAD: CPT

## 2023-01-24 ENCOUNTER — PROCEDURE VISIT (OUTPATIENT)
Dept: ENDOCRINOLOGY | Age: 73
End: 2023-01-24

## 2023-01-24 ENCOUNTER — OFFICE VISIT (OUTPATIENT)
Dept: ENDOCRINOLOGY | Age: 73
End: 2023-01-24
Payer: MEDICARE

## 2023-01-24 ENCOUNTER — HOSPITAL ENCOUNTER (OUTPATIENT)
Dept: GENERAL RADIOLOGY | Age: 73
Discharge: HOME | End: 2023-01-24
Payer: MEDICARE

## 2023-01-24 VITALS
HEIGHT: 65 IN | WEIGHT: 79 LBS | BODY MASS INDEX: 13.16 KG/M2 | DIASTOLIC BLOOD PRESSURE: 65 MMHG | SYSTOLIC BLOOD PRESSURE: 122 MMHG

## 2023-01-24 DIAGNOSIS — M81.0 OSTEOPOROSIS, POSTMENOPAUSAL: Primary | ICD-10-CM

## 2023-01-24 DIAGNOSIS — Z51.81 MEDICATION MONITORING ENCOUNTER: ICD-10-CM

## 2023-01-24 DIAGNOSIS — M81.0 OSTEOPOROSIS, POSTMENOPAUSAL: ICD-10-CM

## 2023-01-24 DIAGNOSIS — E55.9 VITAMIN D DEFICIENCY: ICD-10-CM

## 2023-01-24 PROCEDURE — G8399 PT W/DXA RESULTS DOCUMENT: HCPCS | Performed by: INTERNAL MEDICINE

## 2023-01-24 PROCEDURE — G8427 DOCREV CUR MEDS BY ELIG CLIN: HCPCS | Performed by: INTERNAL MEDICINE

## 2023-01-24 PROCEDURE — 77080 DXA BONE DENSITY AXIAL: CPT | Performed by: INTERNAL MEDICINE

## 2023-01-24 PROCEDURE — 1036F TOBACCO NON-USER: CPT | Performed by: INTERNAL MEDICINE

## 2023-01-24 PROCEDURE — 1123F ACP DISCUSS/DSCN MKR DOCD: CPT | Performed by: INTERNAL MEDICINE

## 2023-01-24 PROCEDURE — 77080 DXA BONE DENSITY AXIAL: CPT

## 2023-01-24 PROCEDURE — 99214 OFFICE O/P EST MOD 30 MIN: CPT | Performed by: INTERNAL MEDICINE

## 2023-01-24 PROCEDURE — 3017F COLORECTAL CA SCREEN DOC REV: CPT | Performed by: INTERNAL MEDICINE

## 2023-01-24 PROCEDURE — G8484 FLU IMMUNIZE NO ADMIN: HCPCS | Performed by: INTERNAL MEDICINE

## 2023-01-24 PROCEDURE — G8419 CALC BMI OUT NRM PARAM NOF/U: HCPCS | Performed by: INTERNAL MEDICINE

## 2023-01-24 PROCEDURE — 1090F PRES/ABSN URINE INCON ASSESS: CPT | Performed by: INTERNAL MEDICINE

## 2023-01-24 RX ORDER — RANOLAZINE 500 MG/1
TABLET, EXTENDED RELEASE ORAL
COMMUNITY
Start: 2022-10-28

## 2023-01-24 RX ORDER — FLUTICASONE FUROATE, UMECLIDINIUM BROMIDE AND VILANTEROL TRIFENATATE 200; 62.5; 25 UG/1; UG/1; UG/1
POWDER RESPIRATORY (INHALATION)
COMMUNITY
Start: 2023-01-06

## 2023-01-24 RX ORDER — AZELASTINE 1 MG/ML
SPRAY, METERED NASAL
COMMUNITY
Start: 2022-12-29

## 2023-01-24 NOTE — PROGRESS NOTES
[facility] ([facility]) Osteoporosis and [address] [facility]  [place] [address] [facility], [address]  Phone [phone]  Fax [phone]    NAME:  Krystal Jackson  : 1950  CONSULT DATE: 2013  MOST RECENT VISIT: 2021  TODAY'S DATE: 2023    Labs @ [facility] 2022    PROBLEMS. Osteoporosis diagnosed , DXA results not available    DXA 2009, lowest T-score -3.6 in the spine     BMD may have decreased in the femoral neck [id number]  Natural menopause age mid to late-40s, ~  Vitamin D deficiency, desirable 25-OH is 30-60 ng/mL    10 ng/mL 2009    53 ng/mL2010 on vitamin D 1600 IU/d    53 ng/mL 2015    46 ng/mL 2017    51 ng/mL 10/2020    51 ng/mL 2023  Microscopic colitis diagnosed , flare in , not yet controlled  NEW  anemia => high cardiac CT score => coronary artery disease, 3 stents    CURRENT MANAGEMENT FOR OSTEOPOROSIS. Calcium, diet 800 mg/d  mg/d = 1300 mg/d    300 mg/d cheese, 200 mg/d leafy greens, 300 mg/d Other  Vitamin D, 1000 IU + 800 IU MVI= 1800 IU/d  Exercise, walking  Pharmacologic therapy, Prolia 60 mg SQ twice yearly started 10/2015    PREVIOUS MEDICATIONS FOR OSTEOPOROSIS. Estrogen, ~-~  Actonel 35 mg/week, [id number]  Boniva 2007-2013  Actonel 150 mg monthly 2013-10/2015    OTHER CURRENT MEDICATIONS. Euceris, budesonide  OTC MEDICATIONS. ASA 81 mg daily    CHIEF COMPLAINT. Here for followup of osteoporosis and vitamin D deficiency, monitoring treatment. No new related signs or symptoms. INTERVAL HISTORY. See problem list for chronic/inactive conditions. She received Prolia without side effects. No falls, near-falls or fractures. Colitis has not been much of a problem this year. Flare of COPD 2022, better now. FOR FULL DETAILS OF FAMILY HISTORY, PAST MEDICAL AND SURGICAL HISTORY, SOCIAL HISTORY, AND REVIEW OF SYSTEMS, SEE PATIENT QUESTIONNAIRE OF TODAY'S DATE. PHYSICAL EXAMINATION.   GENERAL.  Petite, thin, normally proportioned adult.  BMI 15.3  MENTAL STATUS. Pleasant mood.  Oriented to time, place, and person.  MUSCULOSKELETAL.  Spinal contours are normal.  No spine tenderness to palpation or percussion.   Three finger spaces between ribs and pelvis.  Gait steady without assistance.   NEUROLOGICAL. Able to rise from chair without using arms. No apparent focal motor or sensory deficit. Coordination appears normal.     BONE DENSITY.  Most recent done here using Hologic equipment.     T-scores  Initial study: 11/17/2009 L1-L4 -3.6 left total hip -3.1   Current study: 01/24/2023 L1-L4 -3.3 left total hip -2.5     The table below shows bone mineral density (grams/cm2), the appropriate measure for comparing serial scans. An “increase” or “decrease” is significant based on precision studies done at our center according to the ISCD protocol.      PA spine Proximal Femur (left)   Date L1-L4 Fem. neck Trochanter Total hip   11/17/2009 0.655 0.639 NA 0.573   09/23/2014 0.636 0.607 0.412 0.589   09/28/2015 0.622 0.584 0.421 0.574   07/30/2018 0.667 0.606 0.423 0.590   10/12/2020 0.652 0.597 0.433 0.589   12/14/2021 0.665 0.589 0.448 0.618   01/24/2023 0.688 0.640 0.560 0.643   *Done with [employer] equipment. BMD converted to Hologic units.    IMPRESSION:  BONE DENSITY IS LOW, CONSISTENT WITH OSTEOPOROSIS.  SINCE THE PREVIOUS DXA, BMD INCREASED IN THE FEMORAL NECK AND TROCHANTER AND IS TRENDING UP IN THE SPINE AND TOTAL HIP. COMPARED WITH 2015, BEFORE STARTING PROLIA, BMD IS HIGHER NOW AT ALL SITES MEASURED.       Labs: 09/2013, Ca 9.5, Cr 0.7, . 08/2015, Ca 9.3, Cr 0.7. 05/2016 Ca 9.4 Cr 0.7.  03/2018 Ca 8.8 Cr 0.7. 01/2019 Ca 8.5 Cr 0.5. 06/2020 Ca 9.6 Cr 0.7. 11/2021 Ca 9.4 Cr 0.8.  06/2022 Ca 8.2* alb 3.5 Jenaro Ca 8.9 Cr 0.6.  IMAGING.  DXA printouts reviewed. 01/2018 CXR.    ASSESSMENT.  Osteoporosis, bone density lower than desirable. BMD may have decreased in the femoral neck 3937-8154 in  spite of treatment with Boniva. BMD has been stable or increased since 2012 but still quite low. Risk of fracture is high due in part to continued activity of colitis. Entocort and Uceris are thought to have little or no systemic effect in lower doses but high doses could have adverse consequences. She is doing OK with Prolia started 10/2015. PLANS. Continue treatment with Prolia 60 mg SQ twice yearly (next dose scheduled 06/08/2023). Return with DXA in 1 year. Time spent today: 30-39 minutes. Chun Hurd MD, Director, [facility]) Osteoporosis and Bone Health Services    CC: Geoffrey Coreas MD

## 2023-01-24 NOTE — RESULT ENCOUNTER NOTE
[facility]) Osteoporosis and [address] [facility] [address] [facility], [address] [facility],[place] [address]  Phone [phone]  Fax [phone]    NAME: Ruben Sorto   : 1950   STUDY DATE: 2023     REFERRING PROVIDER: Mecca Suazo MD    INDICATION(S) FOR PERFORMING THE STUDY:  osteoporosis, age-related (M81.0)    CLINICAL INFORMATION PROVIDED BY THE PATIENT: 43-year-old woman. She went through natural menopause in her late 45s. No history of fragility fractures. No long-term corticosteroid use. She took weekly Actonel [id number], monthly Boniva 2007-2013 and Actonel monthly 2013-2015. Current treatment is Prolia started 10/2015. EQUIPMENT: [employer]. POSITIONING: Good. REGIONS OF INTEREST: Correct. ARTIFACTS: None. STUDY VALID? Yes. Spine BMD is spuriously high because of generalized degenerative change;  no adjustments were made. T-scores  Initial study: 2009 L1-L4 -3.6 left total hip -3.1   Current study: 2023 L1-L4 -3.3 left total hip -2.5     The table below shows bone mineral density (grams/cm2), the appropriate measure for comparing serial scans. An increase or decrease is significant based on precision studies done at our center according to the ISCD protocol. PA spine Proximal Femur (left)   Date L1-L4 Fem. neck Trochanter Total hip   2009 0.655 0.639 NA 0.573   2014 0.636 0.607 0.412 0.589   2015 0.622 0.584 0.421 0.574   2018 0.667 0.606 0.423 0.590   10/12/2020 0.652 0.597 0.433 0.589   2021 0.665 0.589 0.448 0.618   2023 0.688 0.640 0.560 0.643   *Done with [employer] equipment. BMD converted to Hologic units. IMPRESSION:  BONE DENSITY IS LOW, CONSISTENT WITH OSTEOPOROSIS. SINCE THE PREVIOUS DXA, BMD INCREASED IN THE FEMORAL NECK AND TROCHANTER AND IS TRENDING UP IN THE SPINE AND TOTAL HIP. COMPARED WITH 2015, BEFORE STARTING PROLIA, BMD IS HIGHER NOW AT ALL SITES MEASURED.        Consider repeating this study in 1-2 years to assess the patient's progress. _________________________________________________   Cynthia Hurd MD, Director, [facility] ([facility]) Osteoporosis and [address] [place] [address]

## 2023-05-04 ENCOUNTER — TELEPHONE (OUTPATIENT)
Dept: ENDOCRINOLOGY | Age: 73
End: 2023-05-04

## 2023-06-08 ENCOUNTER — NURSE ONLY (OUTPATIENT)
Dept: ENDOCRINOLOGY | Age: 73
End: 2023-06-08
Payer: MEDICARE

## 2023-06-08 DIAGNOSIS — M81.0 OSTEOPOROSIS, POSTMENOPAUSAL: Primary | ICD-10-CM

## 2023-06-08 PROCEDURE — 96372 THER/PROPH/DIAG INJ SC/IM: CPT | Performed by: INTERNAL MEDICINE

## 2023-12-14 ENCOUNTER — HOSPITAL ENCOUNTER (OUTPATIENT)
Dept: MAMMOGRAPHY | Age: 73
Discharge: HOME | End: 2023-12-14
Payer: MEDICARE

## 2023-12-14 VITALS — WEIGHT: 80 LBS | BODY MASS INDEX: 13.33 KG/M2 | HEIGHT: 65 IN

## 2023-12-14 DIAGNOSIS — Z12.31 VISIT FOR SCREENING MAMMOGRAM: ICD-10-CM

## 2023-12-14 PROCEDURE — 77063 BREAST TOMOSYNTHESIS BI: CPT

## 2023-12-15 ENCOUNTER — NURSE ONLY (OUTPATIENT)
Dept: ENDOCRINOLOGY | Age: 73
End: 2023-12-15
Payer: MEDICARE

## 2023-12-15 DIAGNOSIS — M81.0 OSTEOPOROSIS, POSTMENOPAUSAL: Primary | ICD-10-CM

## 2023-12-15 PROCEDURE — 96372 THER/PROPH/DIAG INJ SC/IM: CPT | Performed by: INTERNAL MEDICINE

## 2024-02-27 ENCOUNTER — HOSPITAL ENCOUNTER (OUTPATIENT)
Dept: GENERAL RADIOLOGY | Age: 74
Discharge: HOME | End: 2024-02-27
Payer: MEDICARE

## 2024-02-27 ENCOUNTER — OFFICE VISIT (OUTPATIENT)
Dept: ENDOCRINOLOGY | Age: 74
End: 2024-02-27
Payer: MEDICARE

## 2024-02-27 VITALS
BODY MASS INDEX: 13.59 KG/M2 | OXYGEN SATURATION: 96 % | HEIGHT: 65 IN | WEIGHT: 81.6 LBS | DIASTOLIC BLOOD PRESSURE: 62 MMHG | SYSTOLIC BLOOD PRESSURE: 132 MMHG | HEART RATE: 76 BPM

## 2024-02-27 DIAGNOSIS — M81.0 OSTEOPOROSIS, POSTMENOPAUSAL: ICD-10-CM

## 2024-02-27 DIAGNOSIS — M81.0 OSTEOPOROSIS, POSTMENOPAUSAL: Primary | ICD-10-CM

## 2024-02-27 DIAGNOSIS — E55.9 VITAMIN D DEFICIENCY: ICD-10-CM

## 2024-02-27 DIAGNOSIS — Z51.81 MEDICATION MONITORING ENCOUNTER: ICD-10-CM

## 2024-02-27 PROCEDURE — G8419 CALC BMI OUT NRM PARAM NOF/U: HCPCS | Performed by: INTERNAL MEDICINE

## 2024-02-27 PROCEDURE — G2211 COMPLEX E/M VISIT ADD ON: HCPCS | Performed by: INTERNAL MEDICINE

## 2024-02-27 PROCEDURE — 1123F ACP DISCUSS/DSCN MKR DOCD: CPT | Performed by: INTERNAL MEDICINE

## 2024-02-27 PROCEDURE — 1090F PRES/ABSN URINE INCON ASSESS: CPT | Performed by: INTERNAL MEDICINE

## 2024-02-27 PROCEDURE — 77080 DXA BONE DENSITY AXIAL: CPT

## 2024-02-27 PROCEDURE — G8399 PT W/DXA RESULTS DOCUMENT: HCPCS | Performed by: INTERNAL MEDICINE

## 2024-02-27 PROCEDURE — G8484 FLU IMMUNIZE NO ADMIN: HCPCS | Performed by: INTERNAL MEDICINE

## 2024-02-27 PROCEDURE — 1036F TOBACCO NON-USER: CPT | Performed by: INTERNAL MEDICINE

## 2024-02-27 PROCEDURE — 99214 OFFICE O/P EST MOD 30 MIN: CPT | Performed by: INTERNAL MEDICINE

## 2024-02-27 PROCEDURE — 3017F COLORECTAL CA SCREEN DOC REV: CPT | Performed by: INTERNAL MEDICINE

## 2024-02-27 PROCEDURE — G8427 DOCREV CUR MEDS BY ELIG CLIN: HCPCS | Performed by: INTERNAL MEDICINE

## 2024-02-27 RX ORDER — EZETIMIBE 10 MG/1
10 TABLET ORAL DAILY
COMMUNITY

## 2024-02-27 NOTE — PROGRESS NOTES
[facility]  [address] [facility] [address] [place] [address]  Phone [phone]  Fax [phone]    NAME:  VANDANA DELONG  : 1950  CONSULT DATE: 2013  MOST RECENT VISIT: 2023  TODAY'S DATE: 2024    Labs @ [facility] 2023    PROBLEMS.  Osteoporosis diagnosed , DXA results not available    DXA 2009, lowest T-score -3.6 in the spine     BMD may have decreased in the femoral neck [id number]  Natural menopause age mid to late-40s, ~  Vitamin D deficiency, desirable 25-OH is 30-60 ng/mL    10 ng/mL 2009    53 ng/mL2010 on vitamin D 1600 IU/d    53 ng/mL 2015    46 ng/mL 2017    51 ng/mL 10/2020    51 ng/mL 2023  Microscopic colitis diagnosed , flare in , not yet controlled  NEW  anemia => high cardiac CT score => coronary artery disease, 3 stents    CURRENT MANAGEMENT FOR OSTEOPOROSIS.  Calcium, diet 800 mg/d  mg/d = 1300 mg/d    300 mg/d cheese, 200 mg/d leafy greens, 300 mg/d Other  Vitamin D, 1000 IU + 800 IU MVI= 1800 IU/d  Exercise, walking  Pharmacologic therapy, Prolia 60 mg SQ twice yearly started 10/2015    PREVIOUS MEDICATIONS FOR OSTEOPOROSIS.   Estrogen, ~-~  Actonel 35 mg/week, 9811-3077  Boniva 2007-2013  Actonel 150 mg monthly 2013-10/2015    OTHER CURRENT MEDICATIONS. Euceris, budesonide  OTC MEDICATIONS. ASA 81 mg daily    CHIEF COMPLAINT. Here for followup of osteoporosis and vitamin D deficiency, monitoring treatment.  No new related signs or symptoms.    INTERVAL HISTORY.   See problem list for chronic/inactive conditions.  She received Prolia without side effects. No falls, near-falls or fractures.  Feels well overall.     FOR FULL DETAILS OF FAMILY HISTORY, PAST MEDICAL AND SURGICAL HISTORY, SOCIAL HISTORY, AND REVIEW OF SYSTEMS, SEE PATIENT QUESTIONNAIRE OF TODAY'S DATE.    PHYSICAL EXAMINATION.   GENERAL.  Petite, thin, normally proportioned adult.  BMI

## 2024-06-12 ENCOUNTER — TELEPHONE (OUTPATIENT)
Dept: ENDOCRINOLOGY | Age: 74
End: 2024-06-12

## 2024-06-20 ENCOUNTER — NURSE ONLY (OUTPATIENT)
Dept: ENDOCRINOLOGY | Age: 74
End: 2024-06-20
Payer: MEDICARE

## 2024-06-20 DIAGNOSIS — M81.0 OSTEOPOROSIS, POSTMENOPAUSAL: Primary | ICD-10-CM

## 2024-06-20 PROCEDURE — 96372 THER/PROPH/DIAG INJ SC/IM: CPT | Performed by: INTERNAL MEDICINE

## 2024-12-02 ENCOUNTER — TELEPHONE (OUTPATIENT)
Dept: ENDOCRINOLOGY | Age: 74
End: 2024-12-02

## 2024-12-16 ENCOUNTER — HOSPITAL ENCOUNTER (OUTPATIENT)
Dept: MAMMOGRAPHY | Age: 74
Discharge: HOME | End: 2024-12-16
Payer: MEDICARE

## 2024-12-16 DIAGNOSIS — Z12.31 BREAST CANCER SCREENING BY MAMMOGRAM: ICD-10-CM

## 2024-12-16 PROCEDURE — 77063 BREAST TOMOSYNTHESIS BI: CPT

## 2025-01-02 ENCOUNTER — NURSE ONLY (OUTPATIENT)
Dept: ENDOCRINOLOGY | Age: 75
End: 2025-01-02
Payer: MEDICARE

## 2025-01-02 DIAGNOSIS — M81.0 OSTEOPOROSIS, POSTMENOPAUSAL: Primary | ICD-10-CM

## 2025-01-02 PROCEDURE — 96372 THER/PROPH/DIAG INJ SC/IM: CPT | Performed by: INTERNAL MEDICINE

## 2025-05-16 ENCOUNTER — TELEPHONE (OUTPATIENT)
Dept: ENDOCRINOLOGY | Age: 75
End: 2025-05-16

## 2025-06-03 DIAGNOSIS — M81.0 OSTEOPOROSIS, POSTMENOPAUSAL: Primary | ICD-10-CM

## 2025-06-25 ENCOUNTER — TELEPHONE (OUTPATIENT)
Dept: ENDOCRINOLOGY | Age: 75
End: 2025-06-25

## 2025-07-09 ENCOUNTER — OFFICE VISIT (OUTPATIENT)
Dept: ENDOCRINOLOGY | Age: 75
End: 2025-07-09
Payer: MEDICARE

## 2025-07-09 ENCOUNTER — HOSPITAL ENCOUNTER (OUTPATIENT)
Dept: GENERAL RADIOLOGY | Age: 75
Discharge: HOME | End: 2025-07-09
Payer: MEDICARE

## 2025-07-09 VITALS — WEIGHT: 85.8 LBS | BODY MASS INDEX: 14.3 KG/M2 | HEIGHT: 65 IN

## 2025-07-09 DIAGNOSIS — M81.0 OSTEOPOROSIS, POSTMENOPAUSAL: Primary | ICD-10-CM

## 2025-07-09 DIAGNOSIS — Z51.81 MEDICATION MONITORING ENCOUNTER: ICD-10-CM

## 2025-07-09 DIAGNOSIS — E55.9 VITAMIN D DEFICIENCY: ICD-10-CM

## 2025-07-09 DIAGNOSIS — M81.0 OSTEOPOROSIS, POSTMENOPAUSAL: ICD-10-CM

## 2025-07-09 PROCEDURE — 96372 THER/PROPH/DIAG INJ SC/IM: CPT | Performed by: INTERNAL MEDICINE

## 2025-07-09 PROCEDURE — 1036F TOBACCO NON-USER: CPT | Performed by: INTERNAL MEDICINE

## 2025-07-09 PROCEDURE — 77080 DXA BONE DENSITY AXIAL: CPT

## 2025-07-09 PROCEDURE — 77080 DXA BONE DENSITY AXIAL: CPT | Performed by: INTERNAL MEDICINE

## 2025-07-09 PROCEDURE — 1123F ACP DISCUSS/DSCN MKR DOCD: CPT | Performed by: INTERNAL MEDICINE

## 2025-07-09 PROCEDURE — G8427 DOCREV CUR MEDS BY ELIG CLIN: HCPCS | Performed by: INTERNAL MEDICINE

## 2025-07-09 PROCEDURE — 99214 OFFICE O/P EST MOD 30 MIN: CPT | Performed by: INTERNAL MEDICINE

## 2025-07-09 PROCEDURE — 1159F MED LIST DOCD IN RCRD: CPT | Performed by: INTERNAL MEDICINE

## 2025-07-09 PROCEDURE — 1090F PRES/ABSN URINE INCON ASSESS: CPT | Performed by: INTERNAL MEDICINE

## 2025-07-09 PROCEDURE — G8399 PT W/DXA RESULTS DOCUMENT: HCPCS | Performed by: INTERNAL MEDICINE

## 2025-07-09 PROCEDURE — G8419 CALC BMI OUT NRM PARAM NOF/U: HCPCS | Performed by: INTERNAL MEDICINE

## 2025-07-09 NOTE — PROGRESS NOTES
[facility]  [address] [facility] [address] [place] [id number]  Phone [phone]  Fax [phone]    NAME:  VANDANA DELONG  : 1950  CONSULT DATE: 2013  MOST RECENT VISIT: 2024  TODAY'S DATE: 2025    Labs @ [facility] 2024     PROBLEMS.  Osteoporosis diagnosed , DXA results not available    DXA 2009, lowest T-score -3.6 in the spine     BMD may have decreased in the femoral neck [id number]  Natural menopause age mid to late-40s, ~  Vitamin D deficiency, desirable 25-OH is 30-60 ng/mL    10 ng/mL 2009    53 ng/mL2010 on vitamin D 1600 IU/d    53 ng/mL 2015    46 ng/mL 2017    50 ng/mL 2024  Microscopic colitis diagnosed , flare in , not yet controlled  NEW  anemia => high cardiac CT score => coronary artery disease, 3 stents    CURRENT MANAGEMENT FOR OSTEOPOROSIS.  Calcium, diet 800 mg/d  mg/d = 1300 mg/d    300 mg/d cheese, 200 mg/d leafy greens, 300 mg/d Other  Vitamin D, 1000 IU + 800 IU MVI= 1800 IU/d  Exercise, walking  Pharmacologic therapy, Prolia 60 mg SQ twice yearly started 10/2015    PREVIOUS MEDICATIONS FOR OSTEOPOROSIS.   Estrogen, ~-~  Actonel 35 mg/week, 7139-2764  Boniva 2007-2013  Actonel 150 mg monthly 2013-10/2015    OTHER CURRENT MEDICATIONS. Euceris, budesonide  OTC MEDICATIONS. ASA 81 mg daily    CHIEF COMPLAINT. Here for followup of osteoporosis and vitamin D deficiency, monitoring treatment.  No new related signs or symptoms.    INTERVAL HISTORY.   See problem list for chronic/inactive conditions.  She received Prolia without side effects. Fell 2024 (dehydrated, post-COVID) but no serious injury. Dealing with arthritis.     FOR FULL DETAILS OF FAMILY HISTORY, PAST MEDICAL AND SURGICAL HISTORY, SOCIAL HISTORY, AND REVIEW OF SYSTEMS, SEE PATIENT QUESTIONNAIRE OF TODAY'S DATE.    PHYSICAL EXAMINATION.   GENERAL.  Petite, thin, normally proportioned adult.